# Patient Record
(demographics unavailable — no encounter records)

---

## 2025-03-13 NOTE — HISTORY OF PRESENT ILLNESS
[FreeTextEntry8] : 62 year old female presents for 4 days of chest pain. Describes it as a pinching sensation in her left chest. No associated symptoms like SOB, nausea, jaw pain or sweating .She felt fine otherwise. States pain was constant. Today pain has resolved.  She does complain of L knee pain for 1-2 months.  Also complains of upper back pain

## 2025-03-13 NOTE — PLAN
[FreeTextEntry1] : Chest pain  - EKG unremarkable  - now resolved - recommend cardio f/u  L knee pain - f/u xray - has tried meloxicam, did not help  Upper back pain - secondary to trapezious hypertonicity - recommend trigger point injections

## 2025-03-31 NOTE — HISTORY OF PRESENT ILLNESS
[de-identified] : Carmel is a 62-year-old female who presents today to the clinic for initial evaluation of left knee pain. Patient reports of having longstanding aches to the left knee, but for the past month she complains the pain has become bothersome. She states the pain would occur when she would knee, but now occurs while she walks. She denies any buckling, catching, and locking. She localizes the pain to the inner aspect of the knee. She also points to the hamstring as well as medial aspect of the knee. She takes Advil and Meloxicam with some help in symptoms. Patient denies any recent fevers, chills or night sweats. Patient denies any radiating pain, numbness, tingling sensations, burning sensations, urinary or bowel incontinence.

## 2025-03-31 NOTE — DISCUSSION/SUMMARY
[de-identified] : We had a thorough discussion regarding the nature of his pain, the pathophysiology, as well as all treatment options. Based on clinical exam and radiograph findings she has an MCL sprain. I discussed operative and non-operative treatment modalities. Conservative measures of treatment include rest until asymptomatic, activity avoidance, NSAID's PRN, application to ice to the area 2-3x daily for 20 minutes, with gradual return to activities. At this time, I recommend patient attend physical therapy. Patient was given prescription of formal physical therapy that he will perform 2x/wk for 6-8 wks. A prescription of Meloxicam was given to be taken as directed with food to prevent GI upset, if occurs pt to D/C and call us at that time. I also recommend patient utilizes a hinged knee brace for support to the knee. All questions were answered and the patient verbalized understanding. The patient is in agreement with this treatment plan. Patient will follow up in 6-8 wks for repeat clinical assessment.

## 2025-03-31 NOTE — DISCUSSION/SUMMARY
[de-identified] : We had a thorough discussion regarding the nature of his pain, the pathophysiology, as well as all treatment options. Based on clinical exam and radiograph findings she has an MCL sprain. I discussed operative and non-operative treatment modalities. Conservative measures of treatment include rest until asymptomatic, activity avoidance, NSAID's PRN, application to ice to the area 2-3x daily for 20 minutes, with gradual return to activities. At this time, I recommend patient attend physical therapy. Patient was given prescription of formal physical therapy that he will perform 2x/wk for 6-8 wks. A prescription of Meloxicam was given to be taken as directed with food to prevent GI upset, if occurs pt to D/C and call us at that time. I also recommend patient utilizes a hinged knee brace for support to the knee. All questions were answered and the patient verbalized understanding. The patient is in agreement with this treatment plan. Patient will follow up in 6-8 wks for repeat clinical assessment.

## 2025-03-31 NOTE — ADDENDUM
[FreeTextEntry1] : Documented by Russell Farmer acting as a scribe for Dr. Winston and Killian Woo PA-C on 03/31/2025. All medical record entries made by the Scribe were at my, Dr. Winston, and Killian Woo's, direction and personally dictated by me on 03/31/2025. I have reviewed the chart and agree that the record accurately reflects my personal performance of the history, physical exam, procedure and imaging.
[FreeTextEntry1] : Documented by Russell Farmer acting as a scribe for Dr. Winston and Killian Woo PA-C on 03/31/2025. All medical record entries made by the Scribe were at my, Dr. Winston, and Killian Woo's, direction and personally dictated by me on 03/31/2025. I have reviewed the chart and agree that the record accurately reflects my personal performance of the history, physical exam, procedure and imaging.
130.2

## 2025-03-31 NOTE — HISTORY OF PRESENT ILLNESS
[de-identified] : Carmel is a 62-year-old female who presents today to the clinic for initial evaluation of left knee pain. Patient reports of having longstanding aches to the left knee, but for the past month she complains the pain has become bothersome. She states the pain would occur when she would knee, but now occurs while she walks. She denies any buckling, catching, and locking. She localizes the pain to the inner aspect of the knee. She also points to the hamstring as well as medial aspect of the knee. She takes Advil and Meloxicam with some help in symptoms. Patient denies any recent fevers, chills or night sweats. Patient denies any radiating pain, numbness, tingling sensations, burning sensations, urinary or bowel incontinence.

## 2025-03-31 NOTE — PHYSICAL EXAM
[de-identified] : General: Well appearing, no acute distress Neurologic: A&Ox3, No focal deficits Head: NCAT without abrasions, lacerations, or ecchymosis to head, face, or scalp Eyes: No scleral icterus, no gross abnormalities Respiratory: Equal chest wall expansion bilaterally, no accessory muscle use Lymphatic: No lymphadenopathy palpated Skin: Warm and dry Psychiatric: Normal mood and affect  Examination of the Left knee reveals no significant effusion, erythema, or ecchymosis. There are no leg length discrepancies appreciated. The patient's ROM is from 2-120 degrees. Tenderness to the medial jointline. Tenderness to the MCL origin and insetion. +2 valgus. No weakness to flexion/extension.  Negative Lachman. Negative pivot shift. Negative anterior drawer test. Negative posterior drawer test. Negative Estela. Negative Apley grind. negative Ebenezer test. No tenderness over the medial and lateral facet of the patella. No patellofemoral crepitations. No lateral tilting patella. No patellar apprehension. No crepitation in the medial and lateral femoral condyle. No proximal or distal swelling, edema or tenderness. No gross motor or sensory deficits. No intra-articular swelling. 2+ DP and PT pulses. No varus or valgus malalignment. Skin is intact. No rashes, scars or lesions. Quad strength normal. Motor exam 5/5 distally, EHL/FHL/GSC/TA. The calf and thigh are soft, supple, and nontender. The patient is grossly neurovascularly intact distally.

## 2025-03-31 NOTE — PHYSICAL EXAM
[de-identified] : General: Well appearing, no acute distress Neurologic: A&Ox3, No focal deficits Head: NCAT without abrasions, lacerations, or ecchymosis to head, face, or scalp Eyes: No scleral icterus, no gross abnormalities Respiratory: Equal chest wall expansion bilaterally, no accessory muscle use Lymphatic: No lymphadenopathy palpated Skin: Warm and dry Psychiatric: Normal mood and affect  Examination of the Left knee reveals no significant effusion, erythema, or ecchymosis. There are no leg length discrepancies appreciated. The patient's ROM is from 2-120 degrees. Tenderness to the medial jointline. Tenderness to the MCL origin and insetion. +2 valgus. No weakness to flexion/extension.  Negative Lachman. Negative pivot shift. Negative anterior drawer test. Negative posterior drawer test. Negative Estela. Negative Apley grind. negative Ebenezer test. No tenderness over the medial and lateral facet of the patella. No patellofemoral crepitations. No lateral tilting patella. No patellar apprehension. No crepitation in the medial and lateral femoral condyle. No proximal or distal swelling, edema or tenderness. No gross motor or sensory deficits. No intra-articular swelling. 2+ DP and PT pulses. No varus or valgus malalignment. Skin is intact. No rashes, scars or lesions. Quad strength normal. Motor exam 5/5 distally, EHL/FHL/GSC/TA. The calf and thigh are soft, supple, and nontender. The patient is grossly neurovascularly intact distally.

## 2025-04-15 NOTE — PROCEDURE
[de-identified] : Injection: US guided Left Knee Joint  A discussion was had with the patient regarding this procedure and all questions were answered. All risks, benefits and alternatives were discussed. These included but were not limited to bleeding, infection, and allergic reaction. Alcohol was used to clean the skin, and chlorhexidine was used to sterilize and prep the area in the supero-lateral aspect of the Left knee. Ethyl chloride spray was then used as a topical anesthetic. A 22-gauge needle was used to inject 3 cc of 1% Xylocaine, 2 cc of 0.25% Bupivacaine, and 1 cc of 40 mg/mL Triamcinolone Acetonide into the Left knee. Ultrasound guidance was used for adequate placement of needle. A sterile bandage was then applied. The patient tolerated the procedure well and there were no complications. Post injection instructions were given.

## 2025-04-15 NOTE — HISTORY OF PRESENT ILLNESS
[de-identified] : FLORENCIA BURRELL is a 62 year old female being seen for an MRI review of her left knee. States she was following with PT and then as of last week she was carrying the garbage out and she felt a pop to her knee. She presents today ambulating with crutches and with a L knee brace. She initially complained of anterior knee pain lingering for several weeks for which she was beginning to follow with PT for.

## 2025-04-15 NOTE — ADDENDUM
[FreeTextEntry1] : Documented by Sandrita Shelton acting as a scribe for Killian Woo PA-C on 04/15/2025. All medical record entries made by the Scribe were at my, Killian Woo's, direction and personally dictated by me on 04/15/2025. I have reviewed the chart and agree that the record accurately reflects my personal performance of the history, physical exam, procedure and imaging.

## 2025-04-15 NOTE — PHYSICAL EXAM
[de-identified] : General: Well appearing, no acute distress Neurologic: A&Ox3, No focal deficits Head: NCAT without abrasions, lacerations, or ecchymosis to head, face, or scalp Eyes: No scleral icterus, no gross abnormalities Respiratory: Equal chest wall expansion bilaterally, no accessory muscle use Lymphatic: No lymphadenopathy palpated Skin: Warm and dry Psychiatric: Normal mood and affect  Examination of the Left knee reveals no significant effusion, erythema, or ecchymosis. There are no leg length discrepancies appreciated. The patient's ROM is from +2-120 degrees. Tenderness to the medial joint line and posterior knee. Tenderness to the MCL origin and insertion. +2 valgus. No weakness to flexion/extension.  Negative Lachman. Negative pivot shift. Negative anterior drawer test. Negative posterior drawer test. Negative Estela. Negative Apley grind. negative Ebenezer test. No tenderness over the medial and lateral facet of the patella. No patellofemoral crepitations. No lateral tilting patella. No patellar apprehension. No crepitation in the medial and lateral femoral condyle. No proximal or distal swelling, edema or tenderness. No gross motor or sensory deficits. No intra-articular swelling. 2+ DP and PT pulses. No varus or valgus malalignment. Skin is intact. No rashes, scars or lesions. Quad strength normal. Motor exam 5/5 distally, EHL/FHL/GSC/TA. The calf and thigh are soft, supple, and nontender. The patient is grossly neurovascularly intact distally. [de-identified] : MRI [facility] EXAM: [id number] - MR KNEE LT  - ORDERED BY:  JOSY GARCIA PROCEDURE DATE:  04/11/2025  Impression: Complex tearing of the medial meniscus resulting in partial extrusion of the body segment. Associated mild medial compartment chondral wear.  Mild patellofemoral compartment chondral wear.  Discoid configuration of the lateral meniscus without significant tear at this time.   XRAY [facility] EXAM: [id number] - MR KNEE LT - ORDERED BY: JOSY GARCIA PROCEDURE DATE: 04/11/2025  Impression: Complex tearing of the medial meniscus resulting in partial extrusion of the body segment. Associated mild medial compartment chondral wear.  Mild patellofemoral compartment chondral wear.  Discoid configuration of the lateral meniscus without significant tear at this time.

## 2025-04-15 NOTE — REASON FOR VISIT
[Follow-Up Visit] : a follow-up visit for [Spouse] : spouse [Knee Pain] : knee pain [FreeTextEntry2] : lt knee pain

## 2025-04-15 NOTE — DISCUSSION/SUMMARY
[de-identified] : We had a thorough discussion regarding the nature of her pain, the pathophysiology, as well as all treatment options. I discussed operative and non-operative treatment modalities. All risks, benefits and alternatives to the proposed surgical procedure, L knee arthroscopy with medial meniscus repair versus partial medial meniscectomy, were discussed in great detail with the patient. Risks include but are not limited to pain, bleeding, infection, stiffness, medical complications (including DVT, PE, MI), and risks of anesthesia. Recommending against surgical intervention at this time given the arthritic changes to the patient's knee. We alternatively discussed the option of a corticosteroid injection to her L knee for symptom relief. Pt due to her acute pain elected for a corticosteroid injection at today's visit and tolerated the procedure well. She should take it easy for the next 2-3 days while icing the joint. All questions were answered and the patient verbalized understanding. The patient is in agreement with this treatment plan. Patient will follow up for repeat clinical assessment.

## 2025-04-17 NOTE — HISTORY OF PRESENT ILLNESS
[FreeTextEntry8] : Pt c/o paresthesia on L cheek x 1 week. Started 1 week ago intermittently, then started becoming more persistent in past 2 days. Now is constant, feels like "tingling or something crawling underneath the skin." Denies pain, new medications, supplements, recent illness, trauma, rash. Takes a MVI regularly. Pt had shingrix vaccine x 2 (completed 2 weeks ago).

## 2025-04-17 NOTE — ASSESSMENT
[FreeTextEntry1] : L cheek paresthesia - Check labs, possible start of shingles. If blistering rash appears start valtrex course (sent to pharmacy)

## 2025-04-17 NOTE — PHYSICAL EXAM
[Normal] : normal sclera/conjunctiva, pupils are equal, round and reactive to light and extraocular movements are intact [Normal Outer Ear/Nose] : the outer ears and nose were normal in appearance [Normal Oropharynx] : the oropharynx was normal [de-identified] : L cheek mild redness no tenderness over maxillae

## 2025-05-19 NOTE — CONSULT LETTER
[Dear  ___] : Dear  [unfilled], [Consult Letter:] : I had the pleasure of evaluating your patient, [unfilled]. [Please see my note below.] : Please see my note below. [Sincerely,] : Sincerely, [FreeTextEntry3] : Dr. Tony Winston

## 2025-05-19 NOTE — PHYSICAL EXAM
[de-identified] : General: Well appearing, no acute distress Neurologic: A&Ox3, No focal deficits Head: NCAT without abrasions, lacerations, or ecchymosis to head, face, or scalp Eyes: No scleral icterus, no gross abnormalities Respiratory: Equal chest wall expansion bilaterally, no accessory muscle use Lymphatic: No lymphadenopathy palpated Skin: Warm and dry Psychiatric: Normal mood and affect  Examination of the Left knee reveals no significant effusion, erythema, or ecchymosis. There are no leg length discrepancies appreciated. The patient's ROM is from +2-120 degrees. Tenderness to the medial joint line and posterior knee. Tenderness to the MCL origin and insertion. +2 valgus. No weakness to flexion/extension.  Negative Lachman. Negative pivot shift. Negative anterior drawer test. Negative posterior drawer test. Negative Estela. Negative Apley grind. negative Ebenezer test. No tenderness over the medial and lateral facet of the patella. No patellofemoral crepitations. No lateral tilting patella. No patellar apprehension. No crepitation in the medial and lateral femoral condyle. No proximal or distal swelling, edema or tenderness. No gross motor or sensory deficits. No intra-articular swelling. 2+ DP and PT pulses. No varus or valgus malalignment. Skin is intact. No rashes, scars or lesions. Quad strength normal. Motor exam 5/5 distally, EHL/FHL/GSC/TA. The calf and thigh are soft, supple, and nontender. The patient is grossly neurovascularly intact distally. [de-identified] : No new imaging.

## 2025-05-19 NOTE — REASON FOR VISIT
[Follow-Up Visit] : a follow-up visit for [Knee Pain] : knee pain [Spouse] : spouse [FreeTextEntry2] : lt knee pain

## 2025-05-19 NOTE — ADDENDUM
[FreeTextEntry1] : Documented by Sandrita Shelton acting as a scribe for Dr. Winston on 05/19/2025. All medical record entries made by the Scribe were at my, Dr. Winston's, direction and personally dictated by me on 05/19/2025. I have reviewed the chart and agree that the record accurately reflects my personal performance of the history, physical exam, procedure and imaging.

## 2025-05-19 NOTE — PHYSICAL EXAM
[de-identified] : General: Well appearing, no acute distress Neurologic: A&Ox3, No focal deficits Head: NCAT without abrasions, lacerations, or ecchymosis to head, face, or scalp Eyes: No scleral icterus, no gross abnormalities Respiratory: Equal chest wall expansion bilaterally, no accessory muscle use Lymphatic: No lymphadenopathy palpated Skin: Warm and dry Psychiatric: Normal mood and affect  Examination of the Left knee reveals no significant effusion, erythema, or ecchymosis. There are no leg length discrepancies appreciated. The patient's ROM is from +2-120 degrees. Tenderness to the medial joint line and posterior knee. Tenderness to the MCL origin and insertion. +2 valgus. No weakness to flexion/extension.  Negative Lachman. Negative pivot shift. Negative anterior drawer test. Negative posterior drawer test. Negative Estela. Negative Apley grind. negative Ebenezer test. No tenderness over the medial and lateral facet of the patella. No patellofemoral crepitations. No lateral tilting patella. No patellar apprehension. No crepitation in the medial and lateral femoral condyle. No proximal or distal swelling, edema or tenderness. No gross motor or sensory deficits. No intra-articular swelling. 2+ DP and PT pulses. No varus or valgus malalignment. Skin is intact. No rashes, scars or lesions. Quad strength normal. Motor exam 5/5 distally, EHL/FHL/GSC/TA. The calf and thigh are soft, supple, and nontender. The patient is grossly neurovascularly intact distally. [de-identified] : No new imaging.

## 2025-05-19 NOTE — DISCUSSION/SUMMARY
[de-identified] : We had a thorough discussion regarding the nature of her pain, the pathophysiology, as well as all treatment options. I discussed operative and non-operative treatment modalities. All risks, benefits and alternatives to the proposed surgical procedure, L knee arthroscopy with medial meniscus repair, were discussed in great detail with the patient. Risks include but are not limited to pain, bleeding, infection, stiffness, medical complications (including DVT, PE, MI), and risks of anesthesia. Patient is considering surgical intervention at this time. She was provided a surgical information packet to review as well as the number of my surgical coordinator. All questions were answered and the patient verbalized understanding. The patient is in agreement with this treatment plan.

## 2025-05-19 NOTE — HISTORY OF PRESENT ILLNESS
[de-identified] : FLORENCIA BURRELL is a 63 year old female being seen for follow up due to L knee. States she was following with PT and then in beginning of April 2025 she notes that she was carrying the garbage out and she felt a pop to her knee. She received an MRI of her L knee which demonstrated a medial meniscal tear. She initially complained of anterior knee pain however she now is pointing to her medial knee as source of her pain. Had L knee corticosteroid injection at her visit on 04/15/2025. She states that the injection only provided her temporary relief to her symptoms.

## 2025-05-19 NOTE — HISTORY OF PRESENT ILLNESS
[de-identified] : FLORNECIA BURRELL is a 63 year old female being seen for follow up due to L knee. States she was following with PT and then in beginning of April 2025 she notes that she was carrying the garbage out and she felt a pop to her knee. She received an MRI of her L knee which demonstrated a medial meniscal tear. She initially complained of anterior knee pain however she now is pointing to her medial knee as source of her pain. Had L knee corticosteroid injection at her visit on 04/15/2025. She states that the injection only provided her temporary relief to her symptoms.

## 2025-05-19 NOTE — DISCUSSION/SUMMARY
[de-identified] : We had a thorough discussion regarding the nature of her pain, the pathophysiology, as well as all treatment options. I discussed operative and non-operative treatment modalities. All risks, benefits and alternatives to the proposed surgical procedure, L knee arthroscopy with medial meniscus repair, were discussed in great detail with the patient. Risks include but are not limited to pain, bleeding, infection, stiffness, medical complications (including DVT, PE, MI), and risks of anesthesia. Patient is considering surgical intervention at this time. She was provided a surgical information packet to review as well as the number of my surgical coordinator. All questions were answered and the patient verbalized understanding. The patient is in agreement with this treatment plan.

## 2025-07-16 NOTE — PROCEDURE
[de-identified] : Injection: US guided Left knee joint.   A discussion was had with the patient regarding this procedure and all questions were answered. All risks, benefits and alternatives were discussed. These included but were not limited to bleeding, infection, and allergic reaction. Alcohol was used to clean the skin, and chlorhexidine was used to sterilize and prep the area in the supero-lateral aspect of the Left knee. Ethyl chloride spray was then used as a topical anesthetic. A 22-gauge needle was used to inject 3cc of 1% Xylocaine, 2cc of 0.25% Bupivacaine and 1cc of 40mg/mL Triamcinolone Acetonide into the Left knee. Ultrasound guidance was used for adequate placement of needle. A sterile band aid was then applied. The patient tolerated the procedure well and there were no complications. Post injection instructions were given.

## 2025-07-16 NOTE — PHYSICAL EXAM
[de-identified] : General: Well appearing, no acute distress Neurologic: A&Ox3, No focal deficits Head: NCAT without abrasions, lacerations, or ecchymosis to head, face, or scalp Eyes: No scleral icterus, no gross abnormalities Respiratory: Equal chest wall expansion bilaterally, no accessory muscle use Lymphatic: No lymphadenopathy palpated Skin: Warm and dry Psychiatric: Normal mood and affect  Examination of the Left knee reveals no significant effusion, erythema, or ecchymosis. There are no leg length discrepancies appreciated. The patient's ROM is from +2-120 degrees. Tenderness to the medial joint line and posterior knee. Tenderness to the MCL origin and insertion. +2 valgus. No weakness to flexion/extension.  Negative Lachman. Negative pivot shift. Negative anterior drawer test. Negative posterior drawer test. Negative Estela. Negative Apley grind. negative Ebenezer test. No tenderness over the medial and lateral facet of the patella. No patellofemoral crepitations. No lateral tilting patella. No patellar apprehension. No crepitation in the medial and lateral femoral condyle. No proximal or distal swelling, edema or tenderness. No gross motor or sensory deficits. No intra-articular swelling. 2+ DP and PT pulses. No varus or valgus malalignment. Skin is intact. No rashes, scars or lesions. Quad strength normal. Motor exam 5/5 distally, EHL/FHL/GSC/TA. The calf and thigh are soft, supple, and nontender. The patient is grossly neurovascularly intact distally. [de-identified] : No new imaging.

## 2025-07-16 NOTE — HISTORY OF PRESENT ILLNESS
[de-identified] : FLORENCIA BURRELL is a 63 year old female being seen for follow up due to L knee. States she was following with PT and then in beginning of April 2025 she notes that she was carrying the garbage out and she felt a pop to her knee. She received an MRI of her L knee which demonstrated a medial meniscal tear. She initially complained of anterior knee pain however she now is pointing to her medial knee as source of her pain. Had L knee corticosteroid injection at her visit on 04/15/2025. She presents today requesting for an addition corticosteroid injection.  She received clearance from her brain surgeon as she is having a tumor removed in 2 weeks.

## 2025-07-16 NOTE — DISCUSSION/SUMMARY
[de-identified] : We had a thorough discussion regarding the nature of her pain, the pathophysiology, as well as all treatment options. I discussed operative and non-operative treatment modalities. At this time the patient's brain surgery takes priority to her knee arthroscopy.  We discussed cortisone injection, she had one in April with modest improvement.  She would like to try it again and I feel it is reasonable given her circumstances.  This was performed, she will follow-up with us in 3 months or as needed.  She was shown a compression knee sleeve which she would like to consider.  All questions were answered